# Patient Record
Sex: FEMALE | Race: OTHER | NOT HISPANIC OR LATINO
[De-identification: names, ages, dates, MRNs, and addresses within clinical notes are randomized per-mention and may not be internally consistent; named-entity substitution may affect disease eponyms.]

---

## 2020-07-20 PROBLEM — Z00.00 ENCOUNTER FOR PREVENTIVE HEALTH EXAMINATION: Status: ACTIVE | Noted: 2020-07-20

## 2020-07-21 ENCOUNTER — APPOINTMENT (OUTPATIENT)
Dept: OTOLARYNGOLOGY | Facility: CLINIC | Age: 85
End: 2020-07-21
Payer: MEDICAID

## 2020-07-21 VITALS
HEIGHT: 66 IN | DIASTOLIC BLOOD PRESSURE: 80 MMHG | HEART RATE: 70 BPM | WEIGHT: 154 LBS | SYSTOLIC BLOOD PRESSURE: 157 MMHG | TEMPERATURE: 96.9 F | BODY MASS INDEX: 24.75 KG/M2

## 2020-07-21 DIAGNOSIS — Z86.79 PERSONAL HISTORY OF OTHER DISEASES OF THE CIRCULATORY SYSTEM: ICD-10-CM

## 2020-07-21 PROCEDURE — 99204 OFFICE O/P NEW MOD 45 MIN: CPT

## 2020-07-21 RX ORDER — CIPROFLOXACIN AND DEXAMETHASONE 3; 1 MG/ML; MG/ML
0.3-0.1 SUSPENSION/ DROPS AURICULAR (OTIC) TWICE DAILY
Qty: 1 | Refills: 1 | Status: ACTIVE | COMMUNITY
Start: 2020-07-21 | End: 1900-01-01

## 2020-07-21 RX ORDER — AMOXICILLIN AND CLAVULANATE POTASSIUM 875; 125 MG/1; MG/1
875-125 TABLET, COATED ORAL
Qty: 20 | Refills: 0 | Status: ACTIVE | COMMUNITY
Start: 2020-07-21 | End: 1900-01-01

## 2020-07-21 NOTE — HISTORY OF PRESENT ILLNESS
[de-identified] : 85F here for initial evaluation.\par \par 3 months ago she developed pain and diminished hearing in the left ear with swelling around the ear. She took cortisporin otic drops and her sx only mildly improved, but then got worse and was given more drops and augmentin. Afterwards, her sx nearly completely resolved. However, several days recurred with pain, diminished hearing and swelling around the left ear. She was started back on augmentin and also given ciprodex drops and is now here in evaluation. Since starting the meds, her sx have improved - there is much less pain and swelling but there is still muffled hearing. Prior to three months ago, she had no issues w her ears.\par No DM.\par \par ROS otherwise unremarkable.

## 2020-07-21 NOTE — PHYSICAL EXAM
[Midline] : trachea located in midline position [Normal] : no rashes [de-identified] : soft flat, no masses/lesions [FreeTextEntry1] : Ad: eac clear and dry, TM intact and mobile, ME clear\par As: eac w moist debris/cerumen removed w suction. EAC clear w erythema. TM intact and hypomobile, ME seems clear\par \par mastoids and preauricular area symmetric, nontender b/l

## 2020-07-21 NOTE — CONSULT LETTER
[Dear  ___] : Dear  [unfilled], [Courtesy Letter:] : I had the pleasure of seeing your patient, [unfilled], in my office today. [Consult Closing:] : Thank you very much for allowing me to participate in the care of this patient.  If you have any questions, please do not hesitate to contact me. [Sincerely,] : Sincerely, [FreeTextEntry3] : Aramis Monson MD\par Department of Otolaryngology - Head and Neck Surgery\par [facility]

## 2020-08-18 ENCOUNTER — APPOINTMENT (OUTPATIENT)
Dept: OTOLARYNGOLOGY | Facility: CLINIC | Age: 85
End: 2020-08-18
Payer: MEDICAID

## 2020-08-18 VITALS
HEART RATE: 70 BPM | BODY MASS INDEX: 24.75 KG/M2 | SYSTOLIC BLOOD PRESSURE: 119 MMHG | WEIGHT: 154 LBS | HEIGHT: 66 IN | DIASTOLIC BLOOD PRESSURE: 76 MMHG | TEMPERATURE: 96.7 F

## 2020-08-18 DIAGNOSIS — H60.90 UNSPECIFIED OTITIS EXTERNA, UNSPECIFIED EAR: ICD-10-CM

## 2020-08-18 PROCEDURE — 92557 COMPREHENSIVE HEARING TEST: CPT

## 2020-08-18 PROCEDURE — 31231 NASAL ENDOSCOPY DX: CPT

## 2020-08-18 PROCEDURE — 99214 OFFICE O/P EST MOD 30 MIN: CPT | Mod: 25

## 2020-08-18 PROCEDURE — 92550 TYMPANOMETRY & REFLEX THRESH: CPT

## 2020-08-18 NOTE — ASSESSMENT
[FreeTextEntry1] : 85F here in followup. Since last seen 4 weeks ago, she finished the augmentin and has been using the ciprodex. However, she still c/o left sided ear, scalp and upper neck pain which she says is getting worse, wakes her up at night and is very uncomfortable; she is taking ibuprofen regularly for the pain. There is no further ear drainage; left sided hearing remains muffled. Audiogram from today shows symmetric bone lines with left sided air-bone gaps >30dB at all frequencies: there is a mild to moderately severe right SNHL and a moderately severe to profound left mixed heaing loss and flat tympanogram. On exam, the left EAC is dry with mildly erythematous EAC walls but no granulation nor fungal debris; the left TM is immobile with a middle ear effusion. The rest of the complete and comprehensive head and neck exam, including nasal endoscopy, is unremarkable.\par Her prior left otitis externa has for the most part resolved, but she still has severe left ear/scalp/facial pain, which seems to be getting worse as per pt; there is also a left middle ear effusion. Unclear as to why her sx are worsening and also as to why new left otitis media on exam today. Given sx, length of time and age, I will get CT temporal bone to r/o any possibility of malignant otitis externa/osteomyelitis or other entity. RTO thereafter.

## 2020-08-18 NOTE — PHYSICAL EXAM
[de-identified] : soft flat, no masses/lesions [Midline] : trachea located in midline position [Normal] : orientation to person, place, and time: normal [de-identified] : CN 2-12 grossly symmetric and intact [FreeTextEntry1] : Ad: eac clear and dry, TM intact and mobile, ME clear\par As: eac patent and dry w mild erythema. TM intact, immobile, +ME effusion\par \par mastoids and preauricular area symmetric

## 2020-08-18 NOTE — HISTORY OF PRESENT ILLNESS
[de-identified] : 85F here in followup.\par \par Since last seen 4 weeks ago, she finished the augmentin and has been using the ciprodex. However, she still c/o left sided ear, scalp and upper neck pain which she says is getting worse, wakes her up at night and is very uncomfortable. There is no further ear drainage; left sided hearing remains muffled.\par \par Audiogram from today (symmetric bone lines with air-bone gaps >30dB at all frequencies):\par R ear: mild to moderately severe SNHL. type A tymp\par L ear: moderately severe to profound mixed HL. type B tymp\par \par From prior note--\par 3 months ago she developed pain and diminished hearing in the left ear with swelling around the ear. She took cortisporin otic drops and her sx only mildly improved, but then got worse and was given more drops and augmentin. Afterwards, her sx nearly completely resolved. However, several days recurred with pain, diminished hearing and swelling around the left ear. She was started back on augmentin and also given ciprodex drops and is now here in evaluation. Since starting the meds, her sx have improved - there is much less pain and swelling but there is still muffled hearing. Prior to three months ago, she had no issues w her ears.\par No DM.\par \par ROS otherwise unremarkable.

## 2020-08-18 NOTE — CONSULT LETTER
[Dear  ___] : Dear  [unfilled], [Courtesy Letter:] : I had the pleasure of seeing your patient, [unfilled], in my office today. [FreeTextEntry3] : Aramis Monson MD\par Department of Otolaryngology - Head and Neck Surgery\par [facility] [Consult Closing:] : Thank you very much for allowing me to participate in the care of this patient.  If you have any questions, please do not hesitate to contact me. [Sincerely,] : Sincerely,

## 2020-09-02 ENCOUNTER — APPOINTMENT (OUTPATIENT)
Dept: CT IMAGING | Facility: HOSPITAL | Age: 85
End: 2020-09-02
Payer: MEDICAID

## 2020-09-02 ENCOUNTER — OUTPATIENT (OUTPATIENT)
Dept: OUTPATIENT SERVICES | Facility: HOSPITAL | Age: 85
LOS: 1 days | End: 2020-09-02
Payer: MEDICAID

## 2020-09-02 PROCEDURE — 70481 CT ORBIT/EAR/FOSSA W/DYE: CPT | Mod: 26

## 2020-09-02 PROCEDURE — 70481 CT ORBIT/EAR/FOSSA W/DYE: CPT

## 2020-09-04 ENCOUNTER — APPOINTMENT (OUTPATIENT)
Dept: OTOLARYNGOLOGY | Facility: CLINIC | Age: 85
End: 2020-09-04
Payer: MEDICAID

## 2020-09-04 VITALS
BODY MASS INDEX: 24.53 KG/M2 | WEIGHT: 152.6 LBS | SYSTOLIC BLOOD PRESSURE: 153 MMHG | HEIGHT: 66 IN | HEART RATE: 71 BPM | DIASTOLIC BLOOD PRESSURE: 79 MMHG | TEMPERATURE: 98 F

## 2020-09-04 DIAGNOSIS — H60.22 MALIGNANT OTITIS EXTERNA, LEFT EAR: ICD-10-CM

## 2020-09-04 DIAGNOSIS — H92.02 OTALGIA, LEFT EAR: ICD-10-CM

## 2020-09-04 DIAGNOSIS — H60.312 DIFFUSE OTITIS EXTERNA, LEFT EAR: ICD-10-CM

## 2020-09-04 PROCEDURE — 99214 OFFICE O/P EST MOD 30 MIN: CPT

## 2020-09-04 NOTE — DATA REVIEWED
[de-identified] : CT Temporal Bone 9/2/20:\par Findings:\par \par Right Temporal Bone\par \par *  External canal/TM: Normal.\par \par *  Mastoid cavity: Well-developed and clear.\par \par *  Middle ear/ossicles: Normal.\par \par *  Oval/round windows/fissula ante fenestram: Normal.\par \par *  Inner ear/semicircular canals: Normal.\par \par *  Tegmen tympani/mastoideum: Intact.\par \par *  Facial nerve: Normal.\par \par *  ICA/IJV: Normal course and caliber.\par \par *  Vestibular aqueduct: Not dilated.\par \par \par Left Temporal Bone\par \par There is complete opacification of the tympanomastoid cavity, without evidence of ossicular erosion. There is only mild thickening of the external canal skin, with an approximately 5 mm focus of polypoid soft tissue projecting into the posterior medial external canal lumen (series 93283/images 45 and 46). There is extensive erosion of the medial external canal floor as well as the floor of the middle ear cavity with abnormal soft tissue density noted along the extracranial surface of the petrous bone involving the left superior carotid and parapharyngeal spaces. While the left internal carotid artery is normal in caliber, the left internal jugular vein appears attenuated. There is erosive change involving the styloid process, with abnormal enhancing soft tissue extending inferiorly along its course. No drainable fluid collection is evident.\par \par *  Inner ear/semicircular canals: Normal.\par \par *  Tegmen tympani/mastoideum: Intact.\par \par *  Facial nerve: There is extensive erosion of the medial wall of the mastoid segment.\par \par *  ICA/IJV: See above. While asymmetry of the IJV caliber is potentially developmental in etiology, there is the suggestion of vascular attenuation within the soft tissue below the skull base; see, for example, series 9/images 18 through 20. There is also nonenhancing material within the jugular foramen that may represent superior extension of phlegmon. The transverse and sigmoid sinuses are patent.\par \par *  Vestibular aqueduct: Not dilated.\par \par \par Visualized brain/nasopharynx: Grossly unremarkable. No nasopharyngeal mass.\par \par Visualized skull base/paranasal sinuses: Central skull base is intact. Paranasal sinuses are predominantly ventilated.\par \par \par IMPRESSION:\par \par In this patient with complete opacification of the left tympanomastoid cavity, there is extensive bone erosion involving the floor of both the external auditory canal and the middle ear cavity with abnormal enhancement below the skull base, as above. 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 Of concern is a focus of polypoid soft tissue that projects into the medial external canal, of uncertain etiology though potentially reflective of granulation tissue, as well as an attenuated appearance to the internal jugular vein. Please see report above for further detail.\par  [de-identified] : see hpi

## 2020-09-04 NOTE — HISTORY OF PRESENT ILLNESS
[de-identified] : 85F here in followup.\par \par Since last seen 3 weeks ago, she is doing much better. There is minimal upper neck pain at this point, and her left sided ear and scalp pain have resolved. She does not wake up with pain. There is no further ear drainage; left sided hearing remains muffled, but better.\par \par Audiogram from prior visit (symmetric bone lines with air-bone gaps >30dB at all frequencies):\par R ear: mild to moderately severe SNHL. type A tymp\par L ear: moderately severe to profound mixed HL. type B tymp\par \par No DM.\par \par CT temporal bone w IC 9/2/20:\par In this patient with complete opacification of the left tympanomastoid cavity, there is extensive bone erosion involving the floor of both the external auditory canal and the middle ear cavity with abnormal enhancement below the skull base, as above. 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 Of concern is a focus of polypoid soft tissue that projects into the medial external canal, of uncertain etiology though potentially reflective of granulation tissue.\par \par ROS otherwise unremarkable.

## 2020-09-04 NOTE — PHYSICAL EXAM
[Midline] : trachea located in midline position [Normal] : no rashes [de-identified] : soft flat, no masses/lesions [de-identified] : CN 2-12 grossly symmetric and intact [FreeTextEntry1] : Ad: eac clear and dry, TM intact and mobile, ME clear\par As: eac clear and dry, small epithelialized protrusion over posterior wall of canal in front of TM, soft, nontender. TM intact and immobile, but ME clear.\par \par mastoids and preauricular area symmetric

## 2020-09-04 NOTE — PROCEDURE
[FreeTextEntry3] : Nasal Endoscopy (from prior visit):\par nasal airways clear\par nasopharynx patent\par eustachian orifices patent, no lesions

## 2020-09-04 NOTE — CONSULT LETTER
[Dear  ___] : Dear  [unfilled], [Courtesy Letter:] : I had the pleasure of seeing your patient, [unfilled], in my office today. [Consult Closing:] : Thank you very much for allowing me to participate in the care of this patient.  If you have any questions, please do not hesitate to contact me. [Sincerely,] : Sincerely, [FreeTextEntry3] : Aramis Monson MD\par Department of Otolaryngology - Head and Neck Surgery\par [facility]

## 2020-09-04 NOTE — ASSESSMENT
[FreeTextEntry1] : 85F here in followup. Since last seen 3 weeks ago, she is doing much better. There is minimal upper neck pain at this point, and her left sided ear and scalp pain have both resolved. She does not wake up with pain. There is no further ear drainage; left sided hearing remains muffled, but better. 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 On exam, the left EAC is clear and dry; there is a small epithelialized protuberance over the posterior wall of the canal in front of TM, soft, nontender; the TM is intact and immobile, but the ME is clear. All cranial nerves are intact and the rest of the complete and comprehensive head and neck exam is unremarkable. \par Her prior left otitis externa has for the most part resolved and since last seen her sx have all markedly improved; there is still vague upper neck pain, but this too continues to improve and the left middle ear effusion has now resolved. However, her recent CT does show quite a bit of bony erosion and is s c/w osteomyelitis of the temporal bone and this also fits her picture from last visit w severe pain, but this has significantly improved. Perhaps the scan is lagging with her clinical picture? I will get MRI as next step for completeness. She will continue her abx. RTO after MRI. This was discussed at length and all questions answered.

## 2025-01-15 NOTE — ASSESSMENT
[FreeTextEntry1] : 85F here for initial evaluation. 3 months ago she developed pain and diminished left sided hearing with swelling around the left ear. She took cortisporin otic drops and her sx only mildly improved, but then got worse and was given more drops and started augmentin. Afterwards, her sx nearly completely resolved. However, several days ago they recurred with pain, diminished hearing and swelling around the left ear. She was started back on augmentin and also given ciprodex drops and is now here in evaluation. Since starting the meds, her sx have improved - there is much less pain and swelling but there is still muffled hearing. Prior to three months ago, she had no issues w her ears. The right ear has also been fine. On exam, moist debris/cerumen was removed from the left EAC w suction; the canal is clear w erythema and the TM is intact but hypomobile with a grossly clear middle ear. The rest of the complete and comprehensive head and neck exam is unremarkable.\par She has a left otitis externa which seems to be resolving on oral abx and otic drops. Unclear why it has been protracted over the past 3 months - perhaps undertreated? For now, will continue augmentin for 10 days and ciprodex drops for 10 days as well w dry ears. Sx should improve. RTO 3 weeks for evaluation and audiogram. The procedure was performed independently

## 2025-07-01 ENCOUNTER — NON-APPOINTMENT (OUTPATIENT)
Age: 89
End: 2025-07-01

## 2025-07-01 ENCOUNTER — APPOINTMENT (OUTPATIENT)
Dept: OTOLARYNGOLOGY | Facility: CLINIC | Age: 89
End: 2025-07-01
Payer: MEDICAID

## 2025-07-01 VITALS — WEIGHT: 171 LBS

## 2025-07-01 PROBLEM — H61.22 IMPACTED CERUMEN OF LEFT EAR: Status: ACTIVE | Noted: 2025-07-01

## 2025-07-01 PROCEDURE — 69210 REMOVE IMPACTED EAR WAX UNI: CPT | Mod: LT

## 2025-07-01 RX ORDER — CIPROFLOXACIN AND DEXAMETHASONE 3; 1 MG/ML; MG/ML
0.3-0.1 SUSPENSION/ DROPS AURICULAR (OTIC) TWICE DAILY
Qty: 1 | Refills: 1 | Status: ACTIVE | COMMUNITY
Start: 2025-07-01 | End: 1900-01-01

## 2025-07-08 ENCOUNTER — APPOINTMENT (OUTPATIENT)
Dept: OTOLARYNGOLOGY | Facility: CLINIC | Age: 89
End: 2025-07-08

## 2025-07-18 ENCOUNTER — APPOINTMENT (OUTPATIENT)
Dept: OTOLARYNGOLOGY | Facility: CLINIC | Age: 89
End: 2025-07-18
Payer: MEDICAID

## 2025-07-18 PROCEDURE — 92504 EAR MICROSCOPY EXAMINATION: CPT

## 2025-07-18 PROCEDURE — 99214 OFFICE O/P EST MOD 30 MIN: CPT | Mod: 25

## 2025-07-18 RX ORDER — CIPROFLOXACIN AND DEXAMETHASONE 3; 1 MG/ML; MG/ML
0.3-0.1 SUSPENSION/ DROPS AURICULAR (OTIC) TWICE DAILY
Qty: 1 | Refills: 2 | Status: ACTIVE | COMMUNITY
Start: 2025-07-18 | End: 1900-01-01

## 2025-08-18 ENCOUNTER — APPOINTMENT (OUTPATIENT)
Dept: OTOLARYNGOLOGY | Facility: CLINIC | Age: 89
End: 2025-08-18
Payer: MEDICAID

## 2025-08-18 DIAGNOSIS — H69.92 UNSPECIFIED EUSTACHIAN TUBE DISORDER, LEFT EAR: ICD-10-CM

## 2025-08-18 DIAGNOSIS — J34.3 HYPERTROPHY OF NASAL TURBINATES: ICD-10-CM

## 2025-08-18 DIAGNOSIS — H60.312 DIFFUSE OTITIS EXTERNA, LEFT EAR: ICD-10-CM

## 2025-08-18 DIAGNOSIS — H90.3 SENSORINEURAL HEARING LOSS, BILATERAL: ICD-10-CM

## 2025-08-18 PROCEDURE — 92557 COMPREHENSIVE HEARING TEST: CPT

## 2025-08-18 PROCEDURE — 31231 NASAL ENDOSCOPY DX: CPT

## 2025-08-18 PROCEDURE — 69433 CREATE EARDRUM OPENING: CPT | Mod: LT

## 2025-08-18 PROCEDURE — 92550 TYMPANOMETRY & REFLEX THRESH: CPT | Mod: 52
